# Patient Record
Sex: MALE | Race: WHITE
[De-identification: names, ages, dates, MRNs, and addresses within clinical notes are randomized per-mention and may not be internally consistent; named-entity substitution may affect disease eponyms.]

---

## 2019-02-14 ENCOUNTER — HOSPITAL ENCOUNTER (OUTPATIENT)
Dept: HOSPITAL 89 - US | Age: 59
End: 2019-02-14
Attending: NURSE PRACTITIONER
Payer: COMMERCIAL

## 2019-02-14 DIAGNOSIS — R10.10: Primary | ICD-10-CM

## 2019-02-14 PROCEDURE — 76705 ECHO EXAM OF ABDOMEN: CPT

## 2019-02-14 NOTE — RADIOLOGY IMAGING REPORT
FACILITY: [facility] [place] 

 

PATIENT NAME: Hussain Benjamin

: 1960

MR: [id number]

V: [id number]

EXAM DATE: 

ORDERING PHYSICIAN: EDER PUENTE

TECHNOLOGIST: 

 

Location: [facility]

Patient: Hussain Benjamin

: 1960

MRN: [id number]

Visit/Account:[id number]

Date of Sevice:  2019

 

ACCESSION #: [id number]

 

Technique: GALLBLADDER

 

HISTORY: Right upper quadrant pain

 

Comparison: None

 

Technique: Multiple grayscale, color and Doppler sonographic images were obtained for an ultrasound o
f the right upper quadrant.

 

Findings:

Liver is normal in size, contour, and echotexture and measures 15.6 cm in length. There is normal hep
atopedal portal venous flow.

 

Gallbladder wall thickness is 3 mm with no evidence of shadowing stone or sludge within the gallbladd
er lumen. Negative sonographic Randolph's sign reported by the technologist.

 

Common duct measures 4 mm in maximum diameter with no evidence of shadowing stone.

 

Imaged portions of the pancreas are unremarkable.

 

Abdominal aorta and IVC are patent and unremarkable.

 

The right kidney is normal in size, contour, and echotexture measuring  10.7 cm x 5.1  cm x 5.2  cm.

 

IMPRESSION:

1. Unremarkable abdominal ultrasound as above.

 

Report Dictated By: Iván Ku DO at 2019 4:08 PM

 

Report E-Signed By: Iván Ku DO  at 2019 4:10 PM

 

WSN:[id number]

## 2019-03-21 ENCOUNTER — HOSPITAL ENCOUNTER (OUTPATIENT)
Dept: HOSPITAL 89 - RAD | Age: 59
End: 2019-03-21
Attending: NURSE PRACTITIONER
Payer: COMMERCIAL

## 2019-03-21 DIAGNOSIS — M54.5: Primary | ICD-10-CM

## 2019-03-21 PROCEDURE — 72100 X-RAY EXAM L-S SPINE 2/3 VWS: CPT

## 2019-03-21 NOTE — RADIOLOGY IMAGING REPORT
FACILITY: [facility] [place] 

 

PATIENT NAME: Hussain Benjamin

: 1960

MR: [id number]

V: [id number]

EXAM DATE: 

ORDERING PHYSICIAN: EDER PUENTE

TECHNOLOGIST: 

 

[place] [facility] [place]

Patient: Hussain Benjamin

: 1960

MRN: [id number]

Visit/Account:[id number]

Date of Sevice:  3/21/2019

 

ACCESSION #: [id number]

 

Exam type: L-SPINE 2 OR 3 VIEW

 

History: Chronic low back pain, no known injury

 

Comparison: MR lumbar spine May 19, 2008.

 

Findings:

 

There five nonrib-bearing lumbar-type vertebral bodies present.  There is straightening of normal lum
bar lordosis which can be seen with muscle spasm.  There is moderate to severe disc space narrowing a
t L2-3, L3-4 and L4-5.  There is mild disc space narrowing at L1-2 and L5-S1.  Marginal osteophytes a
re seen throughout the lumbar spine.  There is no evidence of acute fracture or subluxation identifie
d

 

IMPRESSION:

 

1.  Straightening of the normal lumbar lordosis which can be seen with muscle spasm

 

Extensive multilevel spondylotic changes of the lumbar spine

 

Report Dictated By: Joselin David MD at 3/21/2019 12:24 PM

 

Report E-Signed By: Joselin David MD  at 3/21/2019 12:26 PM

 

WSN:[id number]